# Patient Record
Sex: MALE | Race: WHITE | HISPANIC OR LATINO | Employment: FULL TIME | URBAN - METROPOLITAN AREA
[De-identification: names, ages, dates, MRNs, and addresses within clinical notes are randomized per-mention and may not be internally consistent; named-entity substitution may affect disease eponyms.]

---

## 2022-09-17 ENCOUNTER — HOSPITAL ENCOUNTER (EMERGENCY)
Facility: HOSPITAL | Age: 57
Discharge: HOME | End: 2022-09-17
Attending: STUDENT IN AN ORGANIZED HEALTH CARE EDUCATION/TRAINING PROGRAM
Payer: OTHER MISCELLANEOUS

## 2022-09-17 VITALS
WEIGHT: 209.44 LBS | TEMPERATURE: 98 F | HEIGHT: 69 IN | HEART RATE: 59 BPM | RESPIRATION RATE: 16 BRPM | BODY MASS INDEX: 31.02 KG/M2 | SYSTOLIC BLOOD PRESSURE: 126 MMHG | OXYGEN SATURATION: 98 % | DIASTOLIC BLOOD PRESSURE: 67 MMHG

## 2022-09-17 DIAGNOSIS — S69.90XA FINGER INJURY: ICD-10-CM

## 2022-09-17 DIAGNOSIS — S62.609A CLOSED FRACTURE DISLOCATION OF PROXIMAL INTERPHALANGEAL (PIP) JOINT OF FINGER, INITIAL ENCOUNTER: ICD-10-CM

## 2022-09-17 DIAGNOSIS — S62.609A: ICD-10-CM

## 2022-09-17 DIAGNOSIS — R07.81 RIB PAIN ON RIGHT SIDE: ICD-10-CM

## 2022-09-17 DIAGNOSIS — S63.259A FINGER DISLOCATION: ICD-10-CM

## 2022-09-17 DIAGNOSIS — S63.259A DISLOCATION OF FINGER, INITIAL ENCOUNTER: Primary | ICD-10-CM

## 2022-09-17 PROCEDURE — 25000003 PHARM REV CODE 250: Performed by: STUDENT IN AN ORGANIZED HEALTH CARE EDUCATION/TRAINING PROGRAM

## 2022-09-17 PROCEDURE — 26770 TREAT FINGER DISLOCATION: CPT | Mod: F3

## 2022-09-17 PROCEDURE — 99285 EMERGENCY DEPT VISIT HI MDM: CPT | Mod: 25

## 2022-09-17 PROCEDURE — 26705 TREAT KNUCKLE DISLOCATION: CPT | Mod: LT

## 2022-09-17 RX ORDER — LIDOCAINE HYDROCHLORIDE 10 MG/ML
10 INJECTION, SOLUTION EPIDURAL; INFILTRATION; INTRACAUDAL; PERINEURAL
Status: COMPLETED | OUTPATIENT
Start: 2022-09-17 | End: 2022-09-17

## 2022-09-17 RX ORDER — IBUPROFEN 800 MG/1
800 TABLET ORAL
Status: COMPLETED | OUTPATIENT
Start: 2022-09-17 | End: 2022-09-17

## 2022-09-17 RX ADMIN — LIDOCAINE HYDROCHLORIDE 100 MG: 10 INJECTION, SOLUTION EPIDURAL; INFILTRATION; INTRACAUDAL; PERINEURAL at 10:09

## 2022-09-17 RX ADMIN — IBUPROFEN 800 MG: 800 TABLET, FILM COATED ORAL at 09:09

## 2022-09-17 NOTE — ED NOTES
MD at bedside for splint to left 4th digit; placed per MD and pt tolerated well; awaiting repeat xray

## 2022-09-20 DIAGNOSIS — M79.642 LEFT HAND PAIN: Primary | ICD-10-CM

## 2022-09-21 ENCOUNTER — OFFICE VISIT (OUTPATIENT)
Dept: ORTHOPEDICS | Facility: CLINIC | Age: 57
End: 2022-09-21
Payer: OTHER MISCELLANEOUS

## 2022-09-21 VITALS — HEIGHT: 69 IN | WEIGHT: 190 LBS | BODY MASS INDEX: 28.14 KG/M2

## 2022-09-21 DIAGNOSIS — S62.625A CLOSED DISPLACED FRACTURE OF MIDDLE PHALANX OF LEFT RING FINGER, INITIAL ENCOUNTER: ICD-10-CM

## 2022-09-21 DIAGNOSIS — Z01.818 PREOP TESTING: ICD-10-CM

## 2022-09-21 DIAGNOSIS — S63.259A DISLOCATION OF FINGER, INITIAL ENCOUNTER: ICD-10-CM

## 2022-09-21 DIAGNOSIS — M79.642 LEFT HAND PAIN: Primary | ICD-10-CM

## 2022-09-21 PROCEDURE — 99204 OFFICE O/P NEW MOD 45 MIN: CPT | Mod: ,,, | Performed by: ORTHOPAEDIC SURGERY

## 2022-09-21 PROCEDURE — 99204 PR OFFICE/OUTPT VISIT, NEW, LEVL IV, 45-59 MIN: ICD-10-PCS | Mod: ,,, | Performed by: ORTHOPAEDIC SURGERY

## 2022-09-21 RX ORDER — IBUPROFEN 800 MG/1
800 TABLET ORAL 3 TIMES DAILY
COMMUNITY

## 2022-09-21 RX ORDER — SODIUM CHLORIDE 9 MG/ML
INJECTION, SOLUTION INTRAVENOUS CONTINUOUS
Status: CANCELLED | OUTPATIENT
Start: 2022-09-21

## 2022-09-21 NOTE — PROGRESS NOTES
Subjective:    CC: Injury of the Left Hand and Pain (L ring finger injury - pt  states that he fell down stairs and can not recall how he injured his finger - he has a splint on today - td)       HPI:  Patient comes in today for his 1st visit.  Patient complains of left hand ring finger pain and swelling.  Patient had a fracture dislocation, reduced in the ER earlier this week.  He has been in a finger splint.  He is present here with a .    ROS: Refer to HPI for pertinent ROS. All other 12 point systems negative.    Objective:    Physical Exam:  Patient is well-nourished developed male he is awake alert and oriented x3 he has an apparent stress is pleasant and cooperative.  Examination of the left hand compartment soft and warm.  Skin is intact.  There is no signs symptoms of DVT infection.  He is very tender to palpation along the PIP joint.  He does have stiffness of the MCP PIP and the IP joint.  He has difficulty with any flexion or extension, sensation is intact to light touch brisk cap refill.    Images:  X-rays three views of the left hand demonstrates a dorsal intra-articular fracture of the middle phalanx questionable subluxation, ring finger. Images Reviewed and discussed with patient.    Assessment:  1. Left hand pain  - X-Ray Hand 3 view Left; Future    2. Dislocation of finger, initial encounter  - Place in Outpatient; Standing  - Full code; Standing  - Vital signs; Standing  - Insert peripheral IV; Standing  - Clip and Prep Other (please specifiy) (Operative site); Standing  - Cleanse with Chlorhexidine (CHG); Standing  - Diet NPO; Standing  - 0.9%  NaCl infusion  - IP VTE LOW RISK PATIENT; Standing  - Place COBY hose; Standing  - Place sequential compression device; Standing  - ceFAZolin (ANCEF) 2 g in dextrose 5 % 50 mL IVPB  - CBC auto differential; Future  - Comprehensive metabolic panel; Future  - EKG 12-lead; Future  - Inpatient consult to Anesthesiology; Standing  - Case  Request Operating Room: ORIF, FINGER; Left 4th finger middle phalynx fracture/dislocation    3. Closed displaced fracture of middle phalanx of left ring finger, initial encounter  - Place in Outpatient; Standing  - Full code; Standing  - Vital signs; Standing  - Insert peripheral IV; Standing  - Clip and Prep Other (please specifiy) (Operative site); Standing  - Cleanse with Chlorhexidine (CHG); Standing  - Diet NPO; Standing  - 0.9%  NaCl infusion  - IP VTE LOW RISK PATIENT; Standing  - Place COBY hose; Standing  - Place sequential compression device; Standing  - ceFAZolin (ANCEF) 2 g in dextrose 5 % 50 mL IVPB  - CBC auto differential; Future  - Comprehensive metabolic panel; Future  - EKG 12-lead; Future  - Inpatient consult to Anesthesiology; Standing  - Case Request Operating Room: ORIF, FINGER; Left 4th finger middle phalynx fracture/dislocation    4. Preop testing  - Place in Outpatient; Standing  - Full code; Standing  - Vital signs; Standing  - Insert peripheral IV; Standing  - Clip and Prep Other (please specifiy) (Operative site); Standing  - Cleanse with Chlorhexidine (CHG); Standing  - Diet NPO; Standing  - 0.9%  NaCl infusion  - IP VTE LOW RISK PATIENT; Standing  - Place COBY hose; Standing  - Place sequential compression device; Standing  - ceFAZolin (ANCEF) 2 g in dextrose 5 % 50 mL IVPB  - CBC auto differential; Future  - Comprehensive metabolic panel; Future  - EKG 12-lead; Future  - Inpatient consult to Anesthesiology; Standing  - Case Request Operating Room: ORIF, FINGER; Left 4th finger middle phalynx fracture/dislocation        Plan:  At this time we discussed his physical exam and x-ray findings.  We have discussed fracture fixation surgically versus conservative treatment.  We have discussed joint stiffness.  We have discussed various treatment options, the pros and cons.  He would like to proceed with surgical intervention.  We will set this up at his convenience.    Follow UP: No follow-ups  on file.

## 2022-09-22 ENCOUNTER — ANESTHESIA EVENT (OUTPATIENT)
Dept: SURGERY | Facility: HOSPITAL | Age: 57
End: 2022-09-22
Payer: OTHER MISCELLANEOUS

## 2022-09-22 NOTE — ANESTHESIA PREPROCEDURE EVALUATION
09/22/2022  Viraj Coleman is a 57 y.o., male.      Pre-op Assessment    I have reviewed the Patient Summary Reports.     I have reviewed the Nursing Notes. I have reviewed the NPO Status.   I have reviewed the Medications.     Review of Systems  Anesthesia Hx:  No problems with previous Anesthesia  History of prior surgery of interest to airway management or planning: Denies Family Hx of Anesthesia complications.   Denies Personal Hx of Anesthesia complications.   Social:  Non-Smoker, Social Alcohol Use    Hematology/Oncology:  Hematology Normal   Oncology Normal     EENT/Dental:EENT/Dental Normal   Cardiovascular:  Cardiovascular Normal Exercise tolerance: good  ECG has been reviewed.    Pulmonary:  Pulmonary Normal    Musculoskeletal:   Left ring finger fracture   Neurological:  Neurology Normal    Endocrine:  Endocrine Normal    Dermatological:  Skin Normal    Psych:  Psychiatric Normal           Physical Exam  General: Well nourished, Cooperative, Alert and Oriented    Airway:  Mallampati: II   Mouth Opening: Normal  TM Distance: Normal  Tongue: Normal  Neck ROM: Normal ROM    Dental:  Intact    Chest/Lungs:  Clear to auscultation, Normal Respiratory Rate    Heart:  Rate: Normal  Rhythm: Regular Rhythm    Musculoskeletal:Fracture left 4th finger middle phalynx      Anesthesia Plan  Type of Anesthesia, risks & benefits discussed:    Anesthesia Type: Regional, MAC, Gen Supraglottic Airway  Intra-op Monitoring Plan: Standard ASA Monitors  Post Op Pain Control Plan: multimodal analgesia, IV/PO Opioids PRN and peripheral nerve block  Induction:  IV  Informed Consent: Informed consent signed with the Patient and all parties understand the risks and agree with anesthesia plan.  All questions answered. Patient consented to blood products? No  ASA Score: 2  Day of Surgery Review of History & Physical: H&P Update  referred to the surgeon/provider.I have interviewed and examined the patient. I have reviewed the patient's H&P dated: 9/26/22. There are no significant changes.     Ready For Surgery From Anesthesia Perspective.     .

## 2022-09-23 ENCOUNTER — HOSPITAL ENCOUNTER (OUTPATIENT)
Dept: CARDIOLOGY | Facility: HOSPITAL | Age: 57
Discharge: HOME | End: 2022-09-23
Attending: ORTHOPAEDIC SURGERY
Payer: OTHER MISCELLANEOUS

## 2022-09-23 ENCOUNTER — HOSPITAL ENCOUNTER (OUTPATIENT)
Dept: PREADMISSION TESTING | Facility: HOSPITAL | Age: 57
Discharge: HOME | End: 2022-09-23
Attending: ORTHOPAEDIC SURGERY
Payer: OTHER MISCELLANEOUS

## 2022-09-23 ENCOUNTER — TELEPHONE (OUTPATIENT)
Dept: ORTHOPEDICS | Facility: CLINIC | Age: 57
End: 2022-09-23
Payer: OTHER MISCELLANEOUS

## 2022-09-23 DIAGNOSIS — S63.259A DISLOCATION OF FINGER, INITIAL ENCOUNTER: ICD-10-CM

## 2022-09-23 DIAGNOSIS — Z01.818 PREOP TESTING: ICD-10-CM

## 2022-09-23 DIAGNOSIS — S62.625A CLOSED DISPLACED FRACTURE OF MIDDLE PHALANX OF LEFT RING FINGER, INITIAL ENCOUNTER: ICD-10-CM

## 2022-09-23 PROCEDURE — 93005 ELECTROCARDIOGRAM TRACING: CPT

## 2022-09-23 PROCEDURE — 99900031 HC PATIENT EDUCATION (STAT)

## 2022-09-23 PROCEDURE — 93041 RHYTHM ECG TRACING: CPT

## 2022-09-23 PROCEDURE — 93010 EKG 12-LEAD: ICD-10-PCS | Mod: ,,, | Performed by: INTERNAL MEDICINE

## 2022-09-23 PROCEDURE — 93010 ELECTROCARDIOGRAM REPORT: CPT | Mod: ,,, | Performed by: INTERNAL MEDICINE

## 2022-09-24 NOTE — ED PROVIDER NOTES
Encounter Date: 9/17/2022       History     Chief Complaint   Patient presents with    Hand Pain     Reports falling on stairs at work and injuring left ring finger. Reports slight swelling and scratches to right lower back. Denies blood thinners or loc. Ambulated to triage and ED room.      58 yo M presents to Ed w/ c/o fall down stairs and jamming L finger, also sustained scratches to back, endorses right lower back pain. No blood thinners, ambulating in triage.  used for entirety of visit    Review of patient's allergies indicates:  No Known Allergies  History reviewed. No pertinent past medical history.  History reviewed. No pertinent surgical history.  History reviewed. No pertinent family history.  Social History     Tobacco Use    Smoking status: Never    Smokeless tobacco: Never     Review of Systems   Constitutional:  Negative for fever.   HENT:  Negative for sore throat.    Respiratory:  Negative for shortness of breath.    Cardiovascular:  Negative for chest pain.   Gastrointestinal:  Negative for nausea.   Genitourinary:  Negative for dysuria.   Musculoskeletal:  Positive for back pain.        Neg except as stated in hpi   Skin:  Negative for rash.   Neurological:  Negative for weakness.   Hematological:  Does not bruise/bleed easily.     Physical Exam     Initial Vitals [09/17/22 0857]   BP Pulse Resp Temp SpO2   (!) 141/77 90 18 98.2 °F (36.8 °C) 97 %      MAP       --         Physical Exam    Constitutional: He appears well-developed and well-nourished.   HENT:   Head: Normocephalic and atraumatic.   Eyes: Conjunctivae and EOM are normal. Pupils are equal, round, and reactive to light.   Neck: Neck supple.   Normal range of motion.  Cardiovascular:  Normal rate, regular rhythm, normal heart sounds and intact distal pulses.           No murmur heard.  Pulmonary/Chest: Breath sounds normal. No respiratory distress. He has no wheezes.   Abdominal: Abdomen is soft. Bowel sounds are  normal. There is no abdominal tenderness.   Musculoskeletal:      Cervical back: Normal range of motion and neck supple.      Comments: L ring finger: edema, ttp, deformity to PIP joint    Superficial abrasion to r lower back     Neurological: He is alert and oriented to person, place, and time. He has normal strength. No sensory deficit. GCS score is 15. GCS eye subscore is 4. GCS verbal subscore is 5. GCS motor subscore is 6.   Skin: Skin is warm and dry. Capillary refill takes less than 2 seconds.   Psychiatric: He has a normal mood and affect.       ED Course   Orthopedic Injury    Date/Time: 9/25/2022 6:36 AM  Performed by: Annie Recinos MD  Authorized by: Annie Recinos MD     Location procedure was performed:  [facility] EMERGENCY DEPARTMENT  Pre-operative diagnosis:  Islocation of the 4th proximal interphalangeal joint with associated fracture at the base  Post-operative diagnosis:  Islocation of the 4th proximal interphalangeal joint with associated fracture at the base  Consent Done?:  Emergent Situation  Injury:     Injury location:  Hand    Location details:  Left hand    Injury type:  Fracture-dislocation      Pre-procedure assessment:     Distal perfusion: normal      Neurological function: diminished      Range of motion: reduced      Local anesthesia used?: Yes      Anesthesia:  Digital block    Local anesthetic:  Lidocaine 1% without epinephrine    Anesthetic total (ml):  5    Patient sedated?: No        Selections made in this section will also lock the Injury type section above.:     Manipulation performed?: Yes      Skin traction used?: Yes      Skeletal traction used?: Yes      Reduction successful?: Yes      Confirmation: Reduction confirmed by x-ray      Immobilization:  Splint    Splint type:  Static finger    Supplies used:  Aluminum splint and cotton padding    Complications: No      Estimated blood loss (mL):  0    Specimens: No    Post-procedure assessment:     Neurovascular status:  Neurovascularly intact      Distal perfusion: normal      Neurological function: normal      Range of motion: splinted    Labs Reviewed - No data to display       Imaging Results              X-Ray Hand 2 View Left (Final result)  Result time 09/17/22 13:21:39      Final result by Awilda Anders MD (09/17/22 13:21:39)                   Impression:      Improved alignment following reduction of the 4th finger with fracture at the base of the middle phalanx.      Electronically signed by: Awilda Anders  Date:    09/17/2022  Time:    13:21               Narrative:    EXAMINATION:  XR HAND 2 VIEW LEFT    CLINICAL HISTORY:  Unspecified injury of unspecified wrist, hand and finger(s), initial encounter    COMPARISON:  X-rays from the same day    FINDINGS:  There is improved alignment following reduction of the 4th finger.  Fracture of the base of the 4th toe middle phalanx is redemonstrated.                                       X-Ray Chest PA And Lateral (Final result)  Result time 09/17/22 10:03:17      Final result by David Langston MD (09/17/22 10:03:17)                   Impression:      No acute chest disease is identified.      Electronically signed by: David Langston  Date:    09/17/2022  Time:    10:03               Narrative:    EXAMINATION:  XR CHEST PA AND LATERAL    CLINICAL HISTORY:  , Pleurodynia.    FINDINGS:  No alveolar consolidation, effusion, or pneumothorax is seen.   The thoracic aorta is normal  cardiac silhouette, central pulmonary vessels and mediastinum are normal in size and are grossly unremarkable.   visualized osseous structures are grossly unremarkable.                                       X-Ray Hand 2 View Left (Final result)  Result time 09/17/22 09:35:59      Final result by David Langston MD (09/17/22 09:35:59)                   Impression:      Fracture dislocation as above.    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      Electronically signed by: David Langston  Date:    09/17/2022  Time:    09:35               Narrative:    EXAMINATION:  XR HAND 2 VIEW LEFT    CLINICAL HISTORY:  Unspecified injury of unspecified wrist, hand and finger(s), initial encounter    COMPARISON:  None.    FINDINGS:  Examination reveals evidence of a dislocation of the 4th proximal interphalangeal joint with associated fracture at the base    There is a flexion deformity of the 5th digit at the interphalangeal joint    Some lytic slightly expansile lesions identified in the 4th metacarpal most likely representing enchondromas    Soft tissues within normal limits.                                       Medications   ibuprofen tablet 800 mg (800 mg Oral Given 9/17/22 0948)   LIDOcaine (PF) 10 mg/ml (1%) injection 100 mg (100 mg Other Given by Other 9/17/22 1045)                              Clinical Impression:   Final diagnoses:  [S69.90XA] Finger injury  [R07.81] Rib pain on right side  [R07.81] Rib pain on right side - fall, bruising to R posterior rib  [S63.259A] Finger dislocation - 4th digit left hand-post reduction  [S63.259A] Dislocation of finger, initial encounter (Primary)  [S62.609A] Closed fracture dislocation of digit of hand, initial encounter  [S62.609A] Closed fracture dislocation of proximal interphalangeal (PIP) joint of finger, initial encounter        ED Disposition Condition    Discharge Stable          ED Prescriptions    None       Follow-up Information       Follow up With Specialties Details Why Contact Info    Seguimiento con [facility]  In 3 days  llamará al paciente con mirza             Annie Recinos MD  09/25/22 0645

## 2022-09-26 ENCOUNTER — ANESTHESIA (OUTPATIENT)
Dept: SURGERY | Facility: HOSPITAL | Age: 57
End: 2022-09-26
Payer: OTHER MISCELLANEOUS

## 2022-09-26 ENCOUNTER — HOSPITAL ENCOUNTER (OUTPATIENT)
Facility: HOSPITAL | Age: 57
Discharge: HOME | End: 2022-09-26
Attending: ORTHOPAEDIC SURGERY | Admitting: ORTHOPAEDIC SURGERY
Payer: OTHER MISCELLANEOUS

## 2022-09-26 DIAGNOSIS — S63.259A DISLOCATION OF FINGER, INITIAL ENCOUNTER: ICD-10-CM

## 2022-09-26 DIAGNOSIS — Z01.818 PREOP TESTING: ICD-10-CM

## 2022-09-26 DIAGNOSIS — M79.642 LEFT HAND PAIN: ICD-10-CM

## 2022-09-26 DIAGNOSIS — S62.625A CLOSED DISPLACED FRACTURE OF MIDDLE PHALANX OF LEFT RING FINGER, INITIAL ENCOUNTER: Primary | ICD-10-CM

## 2022-09-26 PROCEDURE — 63600175 PHARM REV CODE 636 W HCPCS

## 2022-09-26 PROCEDURE — 71000033 HC RECOVERY, INTIAL HOUR: Performed by: ORTHOPAEDIC SURGERY

## 2022-09-26 PROCEDURE — C1769 GUIDE WIRE: HCPCS | Performed by: ORTHOPAEDIC SURGERY

## 2022-09-26 PROCEDURE — 26746 TREAT FINGER FRACTURE EACH: CPT | Mod: F3,,, | Performed by: ORTHOPAEDIC SURGERY

## 2022-09-26 PROCEDURE — 26746 PR OPEN TX ARTICULAR FRACTURE MCP/IP JOINT EA: ICD-10-PCS | Mod: F3,,, | Performed by: ORTHOPAEDIC SURGERY

## 2022-09-26 PROCEDURE — 37000008 HC ANESTHESIA 1ST 15 MINUTES: Performed by: ORTHOPAEDIC SURGERY

## 2022-09-26 PROCEDURE — 63600175 PHARM REV CODE 636 W HCPCS: Performed by: ORTHOPAEDIC SURGERY

## 2022-09-26 PROCEDURE — 25000003 PHARM REV CODE 250: Performed by: NURSE ANESTHETIST, CERTIFIED REGISTERED

## 2022-09-26 PROCEDURE — 36000709 HC OR TIME LEV III EA ADD 15 MIN: Performed by: ORTHOPAEDIC SURGERY

## 2022-09-26 PROCEDURE — 71000015 HC POSTOP RECOV 1ST HR: Performed by: ORTHOPAEDIC SURGERY

## 2022-09-26 PROCEDURE — 63600175 PHARM REV CODE 636 W HCPCS: Performed by: NURSE ANESTHETIST, CERTIFIED REGISTERED

## 2022-09-26 PROCEDURE — 25000003 PHARM REV CODE 250: Performed by: ORTHOPAEDIC SURGERY

## 2022-09-26 PROCEDURE — 36000708 HC OR TIME LEV III 1ST 15 MIN: Performed by: ORTHOPAEDIC SURGERY

## 2022-09-26 PROCEDURE — 37000009 HC ANESTHESIA EA ADD 15 MINS: Performed by: ORTHOPAEDIC SURGERY

## 2022-09-26 DEVICE — K-WIRE 1.0MM: Type: IMPLANTABLE DEVICE | Site: FINGER | Status: FUNCTIONAL

## 2022-09-26 RX ORDER — ONDANSETRON 2 MG/ML
4 INJECTION INTRAMUSCULAR; INTRAVENOUS DAILY PRN
Status: DISCONTINUED | OUTPATIENT
Start: 2022-09-26 | End: 2022-09-26 | Stop reason: HOSPADM

## 2022-09-26 RX ORDER — MIDAZOLAM HYDROCHLORIDE 1 MG/ML
INJECTION INTRAMUSCULAR; INTRAVENOUS
Status: COMPLETED
Start: 2022-09-26 | End: 2022-09-26

## 2022-09-26 RX ORDER — HYDROCODONE BITARTRATE AND ACETAMINOPHEN 5; 325 MG/1; MG/1
1 TABLET ORAL
Status: DISCONTINUED | OUTPATIENT
Start: 2022-09-26 | End: 2022-09-26 | Stop reason: HOSPADM

## 2022-09-26 RX ORDER — HYDROCODONE BITARTRATE AND ACETAMINOPHEN 5; 325 MG/1; MG/1
1 TABLET ORAL EVERY 6 HOURS PRN
Qty: 15 TABLET | Refills: 0 | Status: SHIPPED | OUTPATIENT
Start: 2022-09-26

## 2022-09-26 RX ORDER — DIPHENHYDRAMINE HYDROCHLORIDE 50 MG/ML
12.5 INJECTION INTRAMUSCULAR; INTRAVENOUS ONCE AS NEEDED
Status: DISCONTINUED | OUTPATIENT
Start: 2022-09-26 | End: 2022-09-26 | Stop reason: HOSPADM

## 2022-09-26 RX ORDER — ROPIVACAINE HYDROCHLORIDE 5 MG/ML
INJECTION, SOLUTION EPIDURAL; INFILTRATION; PERINEURAL
Status: COMPLETED
Start: 2022-09-26 | End: 2022-09-26

## 2022-09-26 RX ORDER — ONDANSETRON 2 MG/ML
INJECTION INTRAMUSCULAR; INTRAVENOUS
Status: DISCONTINUED | OUTPATIENT
Start: 2022-09-26 | End: 2022-09-26

## 2022-09-26 RX ORDER — ONDANSETRON 2 MG/ML
4 INJECTION INTRAMUSCULAR; INTRAVENOUS EVERY 6 HOURS PRN
Status: DISCONTINUED | OUTPATIENT
Start: 2022-09-26 | End: 2022-09-26 | Stop reason: HOSPADM

## 2022-09-26 RX ORDER — DEXAMETHASONE SODIUM PHOSPHATE 4 MG/ML
INJECTION, SOLUTION INTRA-ARTICULAR; INTRALESIONAL; INTRAMUSCULAR; INTRAVENOUS; SOFT TISSUE
Status: DISCONTINUED | OUTPATIENT
Start: 2022-09-26 | End: 2022-09-26

## 2022-09-26 RX ORDER — HYDROCODONE BITARTRATE AND ACETAMINOPHEN 5; 325 MG/1; MG/1
1 TABLET ORAL EVERY 6 HOURS PRN
Qty: 15 TABLET | Refills: 0 | Status: SHIPPED | OUTPATIENT
Start: 2022-09-26 | End: 2022-09-26 | Stop reason: SDUPTHER

## 2022-09-26 RX ORDER — HYDROCODONE BITARTRATE AND ACETAMINOPHEN 5; 325 MG/1; MG/1
1 TABLET ORAL EVERY 4 HOURS PRN
Status: DISCONTINUED | OUTPATIENT
Start: 2022-09-26 | End: 2022-09-26 | Stop reason: HOSPADM

## 2022-09-26 RX ORDER — METHOCARBAMOL 500 MG/1
500 TABLET, FILM COATED ORAL EVERY 6 HOURS PRN
Status: DISCONTINUED | OUTPATIENT
Start: 2022-09-26 | End: 2022-09-26 | Stop reason: HOSPADM

## 2022-09-26 RX ORDER — LIDOCAINE HYDROCHLORIDE 20 MG/ML
INJECTION INTRAVENOUS
Status: DISCONTINUED | OUTPATIENT
Start: 2022-09-26 | End: 2022-09-26

## 2022-09-26 RX ORDER — SODIUM CHLORIDE 9 MG/ML
INJECTION, SOLUTION INTRAVENOUS CONTINUOUS
Status: DISCONTINUED | OUTPATIENT
Start: 2022-09-26 | End: 2022-09-26 | Stop reason: HOSPADM

## 2022-09-26 RX ORDER — FENTANYL CITRATE 50 UG/ML
25 INJECTION, SOLUTION INTRAMUSCULAR; INTRAVENOUS EVERY 5 MIN PRN
Status: DISCONTINUED | OUTPATIENT
Start: 2022-09-26 | End: 2022-09-26 | Stop reason: HOSPADM

## 2022-09-26 RX ORDER — MORPHINE SULFATE 4 MG/ML
4 INJECTION, SOLUTION INTRAMUSCULAR; INTRAVENOUS
Status: DISCONTINUED | OUTPATIENT
Start: 2022-09-26 | End: 2022-09-26 | Stop reason: HOSPADM

## 2022-09-26 RX ORDER — MAG HYDROX/ALUMINUM HYD/SIMETH 200-200-20
30 SUSPENSION, ORAL (FINAL DOSE FORM) ORAL EVERY 6 HOURS PRN
Status: DISCONTINUED | OUTPATIENT
Start: 2022-09-26 | End: 2022-09-26 | Stop reason: HOSPADM

## 2022-09-26 RX ORDER — MEPERIDINE HYDROCHLORIDE 25 MG/ML
12.5 INJECTION INTRAMUSCULAR; INTRAVENOUS; SUBCUTANEOUS ONCE AS NEEDED
Status: DISCONTINUED | OUTPATIENT
Start: 2022-09-26 | End: 2022-09-26 | Stop reason: HOSPADM

## 2022-09-26 RX ORDER — CEFAZOLIN 2 G/1
INJECTION, POWDER, FOR SOLUTION INTRAMUSCULAR; INTRAVENOUS
Status: DISCONTINUED
Start: 2022-09-26 | End: 2022-09-26 | Stop reason: HOSPADM

## 2022-09-26 RX ORDER — ONDANSETRON 4 MG/1
8 TABLET, ORALLY DISINTEGRATING ORAL EVERY 8 HOURS PRN
Status: DISCONTINUED | OUTPATIENT
Start: 2022-09-26 | End: 2022-09-26 | Stop reason: HOSPADM

## 2022-09-26 RX ORDER — MIDAZOLAM HYDROCHLORIDE 1 MG/ML
2 INJECTION INTRAMUSCULAR; INTRAVENOUS
Status: DISCONTINUED | OUTPATIENT
Start: 2022-09-26 | End: 2022-09-26 | Stop reason: HOSPADM

## 2022-09-26 RX ORDER — ROPIVACAINE HYDROCHLORIDE 5 MG/ML
30 INJECTION, SOLUTION EPIDURAL; INFILTRATION; PERINEURAL ONCE
Status: COMPLETED | OUTPATIENT
Start: 2022-09-26 | End: 2022-09-26

## 2022-09-26 RX ORDER — MIDAZOLAM HYDROCHLORIDE 1 MG/ML
1 INJECTION INTRAMUSCULAR; INTRAVENOUS
Status: DISCONTINUED | OUTPATIENT
Start: 2022-09-26 | End: 2022-09-26 | Stop reason: HOSPADM

## 2022-09-26 RX ORDER — ROPIVACAINE HYDROCHLORIDE 5 MG/ML
INJECTION, SOLUTION EPIDURAL; INFILTRATION; PERINEURAL
Status: COMPLETED | OUTPATIENT
Start: 2022-09-26 | End: 2022-09-26

## 2022-09-26 RX ORDER — CALCIUM CARBONATE 200(500)MG
500 TABLET,CHEWABLE ORAL 3 TIMES DAILY PRN
Status: DISCONTINUED | OUTPATIENT
Start: 2022-09-26 | End: 2022-09-26 | Stop reason: HOSPADM

## 2022-09-26 RX ORDER — PROPOFOL 10 MG/ML
VIAL (ML) INTRAVENOUS
Status: DISCONTINUED | OUTPATIENT
Start: 2022-09-26 | End: 2022-09-26

## 2022-09-26 RX ADMIN — DEXAMETHASONE SODIUM PHOSPHATE 4 MG: 4 INJECTION, SOLUTION INTRA-ARTICULAR; INTRALESIONAL; INTRAMUSCULAR; INTRAVENOUS; SOFT TISSUE at 12:09

## 2022-09-26 RX ADMIN — LIDOCAINE HYDROCHLORIDE 50 MG: 20 INJECTION, SOLUTION INTRAVENOUS at 12:09

## 2022-09-26 RX ADMIN — ROPIVACAINE HYDROCHLORIDE 4 ML: 5 INJECTION, SOLUTION EPIDURAL; INFILTRATION; PERINEURAL at 10:09

## 2022-09-26 RX ADMIN — ONDANSETRON 4 MG: 2 INJECTION INTRAMUSCULAR; INTRAVENOUS at 12:09

## 2022-09-26 RX ADMIN — MIDAZOLAM HYDROCHLORIDE 2 MG: 1 INJECTION, SOLUTION INTRAMUSCULAR; INTRAVENOUS at 10:09

## 2022-09-26 RX ADMIN — CEFAZOLIN 2 G: 2 INJECTION, POWDER, FOR SOLUTION INTRAMUSCULAR; INTRAVENOUS at 12:09

## 2022-09-26 RX ADMIN — MIDAZOLAM HYDROCHLORIDE 2 MG: 1 INJECTION INTRAMUSCULAR; INTRAVENOUS at 10:09

## 2022-09-26 RX ADMIN — SODIUM CHLORIDE: 9 INJECTION, SOLUTION INTRAVENOUS at 12:09

## 2022-09-26 RX ADMIN — PROPOFOL 150 MG: 10 INJECTION, EMULSION INTRAVENOUS at 12:09

## 2022-09-26 NOTE — ANESTHESIA POSTPROCEDURE EVALUATION
Anesthesia Post Evaluation    Patient: Viraj Coleman    Procedure(s) Performed: Procedure(s) (LRB):  ORIF, FINGER; Left 4th finger middle phalynx fracture/dislocation (Left)    Final Anesthesia Type: general      Patient location during evaluation: OPS  Patient participation: Yes- Able to Participate  Level of consciousness: awake and alert and oriented  Post-procedure vital signs: reviewed and stable  Pain management: adequate  Airway patency: patent    PONV status at discharge: No PONV      Cardiovascular status: stable  Respiratory status: unassisted, room air and spontaneous ventilation  Hydration status: euvolemic  Follow-up not needed.          Vitals Value Taken Time   /80 09/26/22 1346   Temp 36 °C (96.8 °F) 09/26/22 1340   Pulse 59 09/26/22 1348   Resp 13 09/26/22 1348   SpO2 100 % 09/26/22 1348   Vitals shown include unvalidated device data.      No case tracking events are documented in the log.      Pain/Demond Score: Demond Score: 9 (9/26/2022  1:40 PM)

## 2022-09-26 NOTE — ANESTHESIA PROCEDURE NOTES
Peripheral Block    Patient location during procedure: holding area    Reason for block: primary anesthetic    Diagnosis: Left 4th finger fracture   Start time: 9/26/2022 10:43 AM   End time: 9/26/2022 10:44 AM    Staffing  Authorizing Provider: Dharmesh Oliva CRNA  Performing Provider: Dharmesh Oliva CRNA    Preanesthetic Checklist  Completed: patient identified, IV checked, site marked, risks and benefits discussed, surgical consent, monitors and equipment checked, pre-op evaluation and timeout performed  Peripheral Block  Patient position: supine  Prep: ChloraPrep and site prepped and draped  Patient monitoring: heart rate, cardiac monitor, continuous pulse ox, continuous capnometry and frequent blood pressure checks  Block type: forearm - median  Laterality: left  Injection technique: single shot  Needle  Needle gauge: 27 G  Needle localization: anatomical landmarks and paresthesias     Assessment  Injection assessment: negative aspiration and negative parasthesia  Paresthesia pain: none  Heart rate change: no  Slow fractionated injection: yes  Pain Tolerance: comfortable throughout block and no complaints  Medications:    Medications: ropivacaine (NAROPIN) injection 0.5% - Perineural   4 mL - 9/26/2022 10:44:00 AM    Additional Notes  Left 4th finger digital block.  Volar and Dorsal Digital Nerves blocked at base of the left 4th finger.  Patient tolerated well without complication.

## 2022-09-26 NOTE — PLAN OF CARE
Vitals signs stable. Pain and nausea well controlled. Discharge criteria/Demond  met . Ok for transfer to phase II per ROSELYN Oliva CRNA

## 2022-09-26 NOTE — ANESTHESIA PROCEDURE NOTES
Intubation    Date/Time: 9/26/2022 12:17 PM  Performed by: Dharmesh Oliva CRNA  Authorized by: Dharmesh Oliva CRNA     Intubation:     Induction:  Intravenous    Intubated:  Postinduction    Mask Ventilation:  Easy mask    Attempts:  1    Attempted By:  CRNA    Difficult Airway Encountered?: No      Complications:  None    Airway Device:  Supraglottic airway/LMA    Airway Device Size:  4.0    Style/Cuff Inflation:  Cuffed    Placement Verified By:  Capnometry    Complicating Factors:  None    Findings Post-Intubation:  BS equal bilateral

## 2022-09-26 NOTE — TRANSFER OF CARE
Anesthesia Transfer of Care Note    Patient: Viraj Coleman    Procedure(s) Performed: Procedure(s) (LRB):  ORIF, FINGER; Left 4th finger middle phalynx fracture/dislocation (Left)    Patient location: PACU    Anesthesia Type: general    Transport from OR: Transported from OR on room air with adequate spontaneous ventilation    Post pain: adequate analgesia    Post assessment: no apparent anesthetic complications    Post vital signs: stable    Level of consciousness: sedated    Nausea/Vomiting: no nausea/vomiting    Complications: none    Transfer of care protocol was followedComments: /66  HR 61  RR 16  O2 Sat 98  Temp 36      Last vitals:   Visit Vitals  /62   Pulse (!) 48   Temp 36.2 °C (97.2 °F) (Temporal)   Resp 15   SpO2 100%

## 2022-09-26 NOTE — ANESTHESIA POSTPROCEDURE EVALUATION
Anesthesia Post Evaluation    Patient: Viraj Coleman    Procedure(s) Performed: Procedure(s) (LRB):  ORIF, FINGER; Left 4th finger middle phalynx fracture/dislocation (Left)    Final Anesthesia Type: regional      Patient location during evaluation: OPS  Patient participation: Yes- Able to Participate  Level of consciousness: awake and alert and oriented  Post-procedure vital signs: reviewed and stable  Pain management: adequate  Airway patency: patent    PONV status at discharge: No PONV  Anesthetic complications: no      Cardiovascular status: stable  Respiratory status: unassisted, spontaneous ventilation and room air  Hydration status: euvolemic  Follow-up not needed.          Vitals Value Taken Time   /80 09/26/22 1346   Temp 36 °C (96.8 °F) 09/26/22 1340   Pulse 59 09/26/22 1348   Resp 13 09/26/22 1348   SpO2 100 % 09/26/22 1348   Vitals shown include unvalidated device data.      No case tracking events are documented in the log.      Pain/Demond Score: Demond Score: 9 (9/26/2022  1:40 PM)

## 2022-09-26 NOTE — H&P
Admission History & Physical    Subjective:    CC: No chief complaint on file.       HPI:  Viraj Coleman presents today for preoperative evaluation for ORIF left 4th finger fracture dislocation. I reviewed the indications for surgery. The risks and benefits of the proposed and alternative treatments were discussed with the patient. Questions pertinent to the procedure were solicited and answered. Dr. East was available to answer any questions with instruction to call clinic with any further questions.No assurances were given. Informed consent was obtained. The patient expressed good understanding and wished to proceed with scheduling the procedure.     ROS:   Constitutional: No fever, weakness, or fatigue.   Ear/Nose/Mouth/Throat: No nasal congestion or sore throat.   Respiratory: No shortness of breath or cough.   Cardiovascular: No chest pain, palpitations, or peripheral edema.   Gastrointestinal: No nausea, vomiting, or abdominal pain.   Genitourinary: No dysuria.  Musculoskeletal:  Left ring finger pain swelling loss of motion    History reviewed. No pertinent surgical history.     History reviewed. No pertinent past medical history.     Objective:    There were no vitals filed for this visit.     Physical Exam:    Appearance: No distress, good color on room air. Alert and cooperative.  HEENT: Normocephalic. PERRLA EOM intact.   Lungs: Breathing unlabored.  Heart: Regular rate and rhythm.  Abdomen: Soft, non-tender.  No rebound tenderness.  Extremities:  Left hand he is point tender along the PIP joint.  There is some mild subluxation.  He has a lot of stiffness with any flexion or extension of the entire finger and hand.  He is nontender elsewhere, he is neurovascular intact distally  Skin: No rashes or open wounds.        Assessment:  [unfilled]     Plan:  Plan for ORIF left ring finger at [facility]. The patient has been given preoperative instructions and prescriptions for post-operative  medication. Post-operative appointment is scheduled for 2 weeks.

## 2022-09-26 NOTE — BRIEF OP NOTE
[facility] - Periop Services  Brief Operative Note    Surgery Date: 9/26/2022     Surgeon(s) and Role:     * Dylan East MD - Primary    Assisting; ARTURO RAWLS    Pre-op Diagnosis:  Dislocation of finger, initial encounter [S63.259A]  Closed displaced fracture of middle phalanx of left ring finger, initial encounter [S62.625A]  Preop testing [Z01.818]    Post-op Diagnosis:  Post-Op Diagnosis Codes:     * Dislocation of finger, initial encounter [S63.259A]     * Closed displaced fracture of middle phalanx of left ring finger, initial encounter [S62.625A]     * Preop testing [Z01.818]    Procedure(s) (LRB):  ORIF, FINGER; Left 4th finger middle phalynx fracture/dislocation (Left)    Anesthesia: General/ digital block    Operative Findings: see op report.    Estimated Blood Loss:<15cc         Specimens:   Specimen (24h ago, onward)      None              Discharge Note    OUTCOME: Patient tolerated treatment/procedure well without complication and is now ready for discharge.    DISPOSITION: Home or Self Care    FINAL DIAGNOSIS:  Closed displaced fracture of middle phalanx of left ring finger    FOLLOWUP: In clinic    DISCHARGE INSTRUCTIONS:    Discharge Procedure Orders   Diet general     Activity as tolerated     Keep surgical extremity elevated     Ice to affected area     Lifting restrictions   Order Comments: No heavy lifting     No driving, operating heavy equipment or signing legal documents while taking pain medication.     Other restrictions (specify):   Order Comments: Ok to Wean Sling as tolerated. Non weight bearing and no heavy lifting to Left hand.     Call MD for:  temperature >100.4     Call MD for:  persistent nausea and vomiting     Call MD for:  severe uncontrolled pain     Call MD for:  difficulty breathing, headache or visual disturbances     Call MD for:  redness, tenderness, or signs of infection (pain, swelling, redness, odor or green/yellow discharge around incision site)     Call MD for:   hives     Call MD for:  persistent dizziness or light-headedness     Call MD for:  extreme fatigue     Remove dressing in 72 hours     Wound care routine (specify)   Order Comments: Wound care routine: okay to remove dressing in 3 days. Ok ay to shower once removed. Keep incision clean, dry, and no submersion or topicals to incision. Keep incision covered with bulky dressing.        Clinical Reference Documents Added to Patient Instructions         Document    ACUTE PAIN, ADULT ([place])    ANESTHESIA ([place])    HOW TO PREVENT SURGICAL SITE INFECTIONS ([place])

## 2022-09-27 NOTE — OP NOTE
DATE OF PROCEDURE:   9/26/2022    SURGEON:  Dylan East M.D.    ASSISTANT: ARTURO Bahena     ASSISTANT ATTESTATION:  PA was essential throughout key and critical portions of the case, including soft tissue retraction, implant placement, and wound closure.    HOSPITAL: [facility]     PREOPERATIVE DIAGNOSIS:  Left displaced intra-articular middle phalanx fracture of the ring finger and PIP dislocation     POSTOPERATIVE DIAGNOSIS:  Same     PROCEDURES PERFORMED: 1. Open reduction internal fixation middle phalanx fracture left ring finger, intra-articular and displaced 2.  Open reduction PIP dislocation left ring finger      ANESTHESIA: general    IV FLUIDS: Per Anesthesia    ESTIMATED BLOOD LOSS:  10     COUNTS:  Correct.    COMPLICATIONS:  None.    IMPLANTS:   Implant Name Type Inv. Item Serial No.  Lot No. LRB No. Used Action   K-WIRE 1.0MM - [id number]  K-WIRE 1.0MM  SYNTHES  Left 2 Implanted       CONDITION: Stable to PACU.        INDICATIONS FOR PROCEDURE:    Viraj Coleman is a 57 y.o. year old male with continued pain and loss of motion of his unstable left ring finger fracture dislocation. The risks, benefits, and alternatives were discussed with the patient in detail. All questions were answered. Informed consent was obtained.       PROCEDURE IN DETAIL: Patient was found in preoperative holding by Anesthesia, confirmed fit for surgery.  The patient was taken to the operating room, placed on the operating table in supine position.  All prominences were well padded.  Time-out was called, identifying the correct patient, correct procedure, correct site.  All were in agreement.  Patient underwent LMA anesthesia without complications.  The patient was then prepped and draped in normal sterile fashion, leaving the left upper extremity exposed to surgery.  After exsanguination of the left upper extremity tourniquet was inflated.  An s shaped incision was made over the dorsal aspect of the middle  phalanx and PIP joint of the left ring finger.  Soft tissue dissection was taken down to the extensor pérez where it was split in line with its fibers.  Next the hematoma was removed patient had a dislocation of the PIP joint which was unstable, it was reduced.  Next the intra-articular middle phalanx fracture was identified it was curetted of all soft tissue debris.  It was too soft and crumbly for a screw, given this it was anatomically reduced and held with 2 K-wires 1 holding the comminuted fracture and 1 holding the PIP joint reduced.  After this was done multiple views of the big C-arm found the fracture was well reduced the joint was reduced and the hardware was in appropriate position.  Tourniquet was released hemostasis was achieved his extensor pérez and capsule was closed with 0 Vicryl subcutaneous tissue was closed with 2-0 Vicryl skin was closed with 3-0 nylon suture Xeroform 4 x 4 soft tissue dressing was placed on the left hand and wrist he was then awoken by Anesthesia and brought to the PACU in stable condition.       ______________________________  Dylan East MD

## 2022-09-29 VITALS
DIASTOLIC BLOOD PRESSURE: 79 MMHG | SYSTOLIC BLOOD PRESSURE: 143 MMHG | RESPIRATION RATE: 20 BRPM | OXYGEN SATURATION: 100 % | TEMPERATURE: 97 F | HEART RATE: 59 BPM

## 2022-09-30 ENCOUNTER — TELEPHONE (OUTPATIENT)
Dept: ORTHOPEDICS | Facility: CLINIC | Age: 57
End: 2022-09-30

## 2022-09-30 ENCOUNTER — HOSPITAL ENCOUNTER (OUTPATIENT)
Dept: RADIOLOGY | Facility: HOSPITAL | Age: 57
Discharge: HOME | End: 2022-09-30
Attending: STUDENT IN AN ORGANIZED HEALTH CARE EDUCATION/TRAINING PROGRAM

## 2022-09-30 DIAGNOSIS — M79.642 HAND PAIN, LEFT: ICD-10-CM

## 2022-09-30 PROCEDURE — 73130 X-RAY EXAM OF HAND: CPT | Mod: TC,LT

## 2022-09-30 NOTE — TELEPHONE ENCOUNTER
Patient referred for left hand pain on 9/20/22. He arrived here at [facility] for his initial visit today; however ,after his assessment and XR were completed it was discovered he had been seen by Dr. Dylan East in [place] and subsequently undergone ORIF by   on 9/26/2022. Nursing assessment and XR were completed here but he was not seen by MD. He will follow up with Dr. East in 2 weeks, pt already has postop apt.

## 2022-10-12 ENCOUNTER — OFFICE VISIT (OUTPATIENT)
Dept: ORTHOPEDICS | Facility: CLINIC | Age: 57
End: 2022-10-12
Payer: OTHER MISCELLANEOUS

## 2022-10-12 VITALS
SYSTOLIC BLOOD PRESSURE: 135 MMHG | DIASTOLIC BLOOD PRESSURE: 79 MMHG | HEART RATE: 56 BPM | WEIGHT: 204.81 LBS | HEIGHT: 69 IN | BODY MASS INDEX: 30.33 KG/M2

## 2022-10-12 DIAGNOSIS — S62.625A CLOSED DISPLACED FRACTURE OF MIDDLE PHALANX OF LEFT RING FINGER, INITIAL ENCOUNTER: ICD-10-CM

## 2022-10-12 DIAGNOSIS — M79.642 LEFT HAND PAIN: Primary | ICD-10-CM

## 2022-10-12 DIAGNOSIS — S63.259A DISLOCATION OF FINGER, INITIAL ENCOUNTER: ICD-10-CM

## 2022-10-12 PROCEDURE — 99024 POSTOP FOLLOW-UP VISIT: CPT | Mod: ,,, | Performed by: ORTHOPAEDIC SURGERY

## 2022-10-12 PROCEDURE — 99024 PR POST-OP FOLLOW-UP VISIT: ICD-10-PCS | Mod: ,,, | Performed by: ORTHOPAEDIC SURGERY

## 2022-10-12 NOTE — PROGRESS NOTES
Subjective:    CC: Injury and Post-op Evaluation of the Left Hand and Follow-up (L 4th finger ORIF 9/26/22, pt states his finger is better, pain level is a 3/10- taking ibuprofen prn, no other changes, xr today)       HPI:  Patient returns today for repeat exam.  He is here for his 2 week follow-up.  He states it is doing better, he is presently with his .    ROS: Refer to HPI for pertinent ROS. All other 12 point systems negative.    Objective:    Physical Exam:  Left hand, 4th finger, his pin sites look appropriate, his incision is healing nicely he has some mild stiffness of the IP joint, he is neurovascular intact distally.    Images:  X-rays three views left hand demonstrates a comminuted middle phalanx fracture hardware in appropriate position. Images Reviewed and discussed with patient.    Assessment:  1. Left hand pain    2. Dislocation of finger, initial encounter    3. Closed displaced fracture of middle phalanx of left ring finger, initial encounter        Plan:  At this time we discussed his physical exam x-ray findings.  I would like see back next week for suture removal as well as pin removal, start motion at that time, we have discussed fracture healing and joint stiffness in detail.    Follow UP: No follow-ups on file.

## 2022-10-17 ENCOUNTER — OFFICE VISIT (OUTPATIENT)
Dept: ORTHOPEDICS | Facility: CLINIC | Age: 57
End: 2022-10-17
Payer: OTHER MISCELLANEOUS

## 2022-10-17 VITALS
HEART RATE: 61 BPM | BODY MASS INDEX: 30.21 KG/M2 | WEIGHT: 204 LBS | SYSTOLIC BLOOD PRESSURE: 138 MMHG | TEMPERATURE: 97 F | DIASTOLIC BLOOD PRESSURE: 74 MMHG | HEIGHT: 69 IN

## 2022-10-17 DIAGNOSIS — S62.625A CLOSED DISPLACED FRACTURE OF MIDDLE PHALANX OF LEFT RING FINGER, INITIAL ENCOUNTER: ICD-10-CM

## 2022-10-17 DIAGNOSIS — S63.259A DISLOCATION OF FINGER, INITIAL ENCOUNTER: Primary | ICD-10-CM

## 2022-10-17 PROCEDURE — 99024 POSTOP FOLLOW-UP VISIT: CPT | Mod: ,,,

## 2022-10-17 PROCEDURE — 99024 PR POST-OP FOLLOW-UP VISIT: ICD-10-PCS | Mod: ,,,

## 2022-10-17 NOTE — LETTER
October 17, 2022    Viraj Coleman  [address]  [place] [address]         [facility] [address], [address]  [place] [address]  Phone: [phone] October 17, 2022     Patient: Viraj Coleman   YOB: 1965   Date of Visit: 10/17/2022       To Whom It May Concern:    It is my medical opinion that Viraj Coleman may return to light duty immediately with the following restrictions: no heavy lifting or strenuous grasping; no greater than  5 lbs.    If you have any questions or concerns, please don't hesitate to call.    Sincerely,        Dylan East MD

## 2022-10-17 NOTE — PROGRESS NOTES
Subjective:    CC: Post-op Evaluation of the Left Hand (L 4th digit ORIF 9/26/22 - pt is here for pin removal - pt states that if he doesn't move his hand he doesn't have pain - only little pain if he moves it - td)       HPI:  Patient presents to clinic for 2nd postop appointment.  Status post left 4th middle phalanx ORIF on 09/26/2022.  Approximately 3 weeks out. he patient is here for pin and stitch removal.  States he has minimal pain that is only aggravated by movement.  He is at work on light duty.  He denies any numbness or tingling.  No new complaints.    ROS: Refer to HPI for pertinent ROS. All other 12 point systems negative.    Objective:    Vitals:    10/17/22 1430   BP: 138/74   Pulse: 61   Temp: 97.2 °F (36.2 °C)        Physical Exam: Left hand, 4th finger, his pin sites look appropriate; pulled in clinic today. his incision is well healed; stiches removed.  he has some mild stiffness of the IP joint, for which tender to palpation about the 4th finger.  Appropriate range of motion of all fingers.  he is neurovascular intact distally.    Images:  Previous Images Reviewed and discussed with patient.    Assessment:  1. Dislocation of finger, initial encounter    2. Closed displaced fracture of middle phalanx of left ring finger, initial encounter       Plan:  Physical exam and intraoperative findings discussed with patient. He tolerated pin removal well today in clinic.  Wound care instructions given.  He will remain no heavy lifting and nonweightbearing but instructed to utilize range of motion of hand.  He will continue to be light duty at this time. I would like to see the patient back in 4 weeks with repeat x-rays to assess his progress.    Follow up: Follow up in about 4 weeks (around 11/14/2022).

## 2022-11-16 ENCOUNTER — OFFICE VISIT (OUTPATIENT)
Dept: ORTHOPEDICS | Facility: CLINIC | Age: 57
End: 2022-11-16
Payer: OTHER MISCELLANEOUS

## 2022-11-16 VITALS — BODY MASS INDEX: 30.21 KG/M2 | HEIGHT: 69 IN | WEIGHT: 204 LBS

## 2022-11-16 DIAGNOSIS — S62.625A CLOSED DISPLACED FRACTURE OF MIDDLE PHALANX OF LEFT RING FINGER, INITIAL ENCOUNTER: ICD-10-CM

## 2022-11-16 DIAGNOSIS — M79.642 LEFT HAND PAIN: Primary | ICD-10-CM

## 2022-11-16 DIAGNOSIS — S63.259A DISLOCATION OF FINGER, INITIAL ENCOUNTER: ICD-10-CM

## 2022-11-16 PROCEDURE — 99024 PR POST-OP FOLLOW-UP VISIT: ICD-10-PCS | Mod: ,,, | Performed by: ORTHOPAEDIC SURGERY

## 2022-11-16 PROCEDURE — 99024 POSTOP FOLLOW-UP VISIT: CPT | Mod: ,,, | Performed by: ORTHOPAEDIC SURGERY

## 2022-11-16 NOTE — PROGRESS NOTES
"Subjective:    CC: Pain of the Left Hand and Pain (L 4th finger ORIF 9/26/22 - 12/23/22 - pt states that he has pain if he bends his finger - he is taking ibuprofen for pain - td)       HPI:  Patient returns today for repeat exam.  Patient is approximately 7+ weeks from his left comminuted intra-articular phalanx fracture of the ring finger.  He denies any new complaints, he is present with his co-worker and .  He is not taking any pain medications.    ROS: Refer to HPI for pertinent ROS. All other 12 point systems negative.    Objective:  Vitals:    11/16/22 1012   Weight: 92.5 kg (204 lb)   Height: 5' 9" (1.753 m)        Physical Exam:  Left hand, his incision is well healed there is no swelling or erythema.  He holds the PIP in approximately 5° of flexion.  S have some stiffness with full flexion, he is also stiff at the IP joint.  He has full motion of the MCP joint.    Images:  X-rays three views left hand demonstrate slight subluxation of the middle phalanx, callus formation along the dorsal surface. Images Reviewed and discussed with patient.    Assessment:  1. Left hand pain  - X-Ray Hand 3 view Left; Future    2. Closed displaced fracture of middle phalanx of left ring finger, initial encounter  - Ambulatory referral/consult to Physical/Occupational Therapy; Future    3. Dislocation of finger, initial encounter  - Ambulatory referral/consult to Physical/Occupational Therapy; Future        Plan:  At this time we discussed his physical exam and x-ray findings.  He is healing nicely we have discussed hand therapy to regain some strength and motion.  I would like see him back in 4 weeks to see how he is progressing.    Follow UP: No follow-ups on file.                "

## (undated) DEVICE — COVER C-ARM STRAP BAND 44X80IN

## (undated) DEVICE — GLOVE PROTEXIS BLUE LATEX 8.5

## (undated) DEVICE — GOWN POLY REINF X-LONG 2XL

## (undated) DEVICE — PADDING CAST SYNTHETIC 4X4IN

## (undated) DEVICE — GLOVE 8.0 PROTEXIS PI MICRO

## (undated) DEVICE — GAUZE SPONGE 4X4 12PLY

## (undated) DEVICE — GLOVE PROTEXIS BLUE LATEX 6.5

## (undated) DEVICE — BANDAGE CONFORM STRTCH 1IN

## (undated) DEVICE — GLOVE 6.5 PROTEXIS PI MICRO

## (undated) DEVICE — SUT CTD VICRYL 0 UND BR

## (undated) DEVICE — UNDERPAD ULTRASORB 300LB 30X36

## (undated) DEVICE — Device

## (undated) DEVICE — SOL NACL IRR 1000ML BTL

## (undated) DEVICE — MANIFOLD 4 PORT

## (undated) DEVICE — BLADE SURG STAINLESS STEEL #15

## (undated) DEVICE — CLOSURE SKIN STERI STRIP 1/2X4

## (undated) DEVICE — SUT VICRYL PLUS 2-0 CT1 18

## (undated) DEVICE — ELECTRODE PATIENT RETURN DISP

## (undated) DEVICE — SUT 3-0 ETHILON 18 FS-1

## (undated) DEVICE — BANDAGE ESMARK 6INX3YD

## (undated) DEVICE — CUFF CONTOUR DPSB STRL 18IN

## (undated) DEVICE — BANDAGE VELCLOSE ELAS 3INX5YD

## (undated) DEVICE — DRESSING XEROFORM FOIL PK 1X8

## (undated) DEVICE — DRAPE HAND STERILE

## (undated) DEVICE — DRAPE STERI U-SHAPED 47X51IN

## (undated) DEVICE — PADDING CAST SYNTHETIC 2X4IN

## (undated) DEVICE — SLING ARM LARGE FOAM STRAP

## (undated) DEVICE — APPLICATOR CHLORAPREP ORN 26ML

## (undated) DEVICE — KIT SURGICAL TURNOVER